# Patient Record
Sex: FEMALE | Race: WHITE | NOT HISPANIC OR LATINO
[De-identification: names, ages, dates, MRNs, and addresses within clinical notes are randomized per-mention and may not be internally consistent; named-entity substitution may affect disease eponyms.]

---

## 2017-01-24 ENCOUNTER — APPOINTMENT (OUTPATIENT)
Dept: GASTROENTEROLOGY | Facility: CLINIC | Age: 57
End: 2017-01-24

## 2017-01-24 VITALS
HEIGHT: 63 IN | HEART RATE: 83 BPM | SYSTOLIC BLOOD PRESSURE: 124 MMHG | WEIGHT: 113 LBS | TEMPERATURE: 99.1 F | RESPIRATION RATE: 14 BRPM | BODY MASS INDEX: 20.02 KG/M2 | DIASTOLIC BLOOD PRESSURE: 81 MMHG | OXYGEN SATURATION: 99 %

## 2017-01-24 DIAGNOSIS — R13.14 DYSPHAGIA, PHARYNGOESOPHAGEAL PHASE: ICD-10-CM

## 2017-01-24 DIAGNOSIS — R13.10 DYSPHAGIA, UNSPECIFIED: ICD-10-CM

## 2017-01-24 DIAGNOSIS — R12 HEARTBURN: ICD-10-CM

## 2017-01-24 DIAGNOSIS — K22.4 DYSKINESIA OF ESOPHAGUS: ICD-10-CM

## 2017-01-27 PROBLEM — R12 HEARTBURN: Status: ACTIVE | Noted: 2017-01-27

## 2017-01-27 PROBLEM — K22.4 ESOPHAGEAL DYSMOTILITY: Status: ACTIVE | Noted: 2017-01-27

## 2017-01-27 PROBLEM — R13.10 DYSPHAGIA, UNSPECIFIED TYPE: Status: ACTIVE | Noted: 2017-01-27

## 2017-01-27 PROBLEM — R13.14 ESOPHAGEAL DYSPHAGIA: Status: ACTIVE | Noted: 2017-01-27

## 2017-02-02 ENCOUNTER — CLINICAL ADVICE (OUTPATIENT)
Age: 57
End: 2017-02-02

## 2017-03-23 ENCOUNTER — RESULT REVIEW (OUTPATIENT)
Age: 57
End: 2017-03-23

## 2017-03-24 ENCOUNTER — APPOINTMENT (OUTPATIENT)
Dept: GASTROENTEROLOGY | Facility: HOSPITAL | Age: 57
End: 2017-03-24

## 2017-03-24 ENCOUNTER — OUTPATIENT (OUTPATIENT)
Dept: OUTPATIENT SERVICES | Facility: HOSPITAL | Age: 57
LOS: 1 days | End: 2017-03-24
Payer: COMMERCIAL

## 2017-03-24 DIAGNOSIS — M35.00 SJOGREN SYNDROME, UNSPECIFIED: ICD-10-CM

## 2017-03-24 DIAGNOSIS — K21.9 GASTRO-ESOPHAGEAL REFLUX DISEASE WITHOUT ESOPHAGITIS: ICD-10-CM

## 2017-03-24 PROCEDURE — 88342 IMHCHEM/IMCYTCHM 1ST ANTB: CPT

## 2017-03-24 PROCEDURE — 43239 EGD BIOPSY SINGLE/MULTIPLE: CPT

## 2017-03-24 PROCEDURE — 43239 EGD BIOPSY SINGLE/MULTIPLE: CPT | Mod: GC

## 2017-03-24 PROCEDURE — 88305 TISSUE EXAM BY PATHOLOGIST: CPT | Mod: 26

## 2017-03-24 PROCEDURE — 88305 TISSUE EXAM BY PATHOLOGIST: CPT

## 2017-03-24 PROCEDURE — 88342 IMHCHEM/IMCYTCHM 1ST ANTB: CPT | Mod: 26

## 2017-03-27 ENCOUNTER — TRANSCRIPTION ENCOUNTER (OUTPATIENT)
Age: 57
End: 2017-03-27

## 2017-03-27 LAB — SURGICAL PATHOLOGY STUDY: SIGNIFICANT CHANGE UP

## 2017-04-07 RX ORDER — SUCRALFATE 1 G/10ML
1 SUSPENSION ORAL
Qty: 1200 | Refills: 2 | Status: DISCONTINUED | COMMUNITY
Start: 2017-01-24 | End: 2017-04-07

## 2017-04-13 ENCOUNTER — APPOINTMENT (OUTPATIENT)
Dept: SURGICAL ONCOLOGY | Facility: CLINIC | Age: 57
End: 2017-04-13

## 2017-04-27 ENCOUNTER — APPOINTMENT (OUTPATIENT)
Dept: SURGICAL ONCOLOGY | Facility: CLINIC | Age: 57
End: 2017-04-27

## 2017-04-27 VITALS
HEIGHT: 63 IN | BODY MASS INDEX: 19.14 KG/M2 | WEIGHT: 108 LBS | SYSTOLIC BLOOD PRESSURE: 100 MMHG | DIASTOLIC BLOOD PRESSURE: 66 MMHG | HEART RATE: 87 BPM

## 2017-06-22 ENCOUNTER — APPOINTMENT (OUTPATIENT)
Dept: GASTROENTEROLOGY | Facility: CLINIC | Age: 57
End: 2017-06-22

## 2017-09-05 ENCOUNTER — APPOINTMENT (OUTPATIENT)
Dept: INFECTIOUS DISEASE | Facility: CLINIC | Age: 57
End: 2017-09-05
Payer: SELF-PAY

## 2017-09-05 DIAGNOSIS — Z71.89 OTHER SPECIFIED COUNSELING: ICD-10-CM

## 2017-09-05 PROCEDURE — 90472 IMMUNIZATION ADMIN EACH ADD: CPT | Mod: NC

## 2017-09-05 PROCEDURE — 90632 HEPA VACCINE ADULT IM: CPT

## 2017-09-05 PROCEDURE — 99401 PREV MED CNSL INDIV APPRX 15: CPT | Mod: 25

## 2017-09-05 PROCEDURE — 90471 IMMUNIZATION ADMIN: CPT | Mod: NC

## 2017-09-05 PROCEDURE — 90691 TYPHOID VACCINE IM: CPT

## 2017-10-26 ENCOUNTER — TRANSCRIPTION ENCOUNTER (OUTPATIENT)
Age: 57
End: 2017-10-26

## 2017-11-08 ENCOUNTER — TRANSCRIPTION ENCOUNTER (OUTPATIENT)
Age: 57
End: 2017-11-08

## 2018-03-20 ENCOUNTER — APPOINTMENT (OUTPATIENT)
Dept: INFECTIOUS DISEASE | Facility: CLINIC | Age: 58
End: 2018-03-20
Payer: SELF-PAY

## 2018-03-20 PROCEDURE — 90632 HEPA VACCINE ADULT IM: CPT

## 2018-03-20 PROCEDURE — 90471 IMMUNIZATION ADMIN: CPT | Mod: NC

## 2018-04-26 ENCOUNTER — APPOINTMENT (OUTPATIENT)
Dept: SURGICAL ONCOLOGY | Facility: CLINIC | Age: 58
End: 2018-04-26
Payer: COMMERCIAL

## 2018-04-26 VITALS
HEIGHT: 63 IN | HEART RATE: 79 BPM | OXYGEN SATURATION: 98 % | BODY MASS INDEX: 19.49 KG/M2 | DIASTOLIC BLOOD PRESSURE: 59 MMHG | WEIGHT: 110 LBS | SYSTOLIC BLOOD PRESSURE: 92 MMHG | RESPIRATION RATE: 14 BRPM

## 2018-04-26 PROCEDURE — 99214 OFFICE O/P EST MOD 30 MIN: CPT

## 2018-07-12 ENCOUNTER — TRANSCRIPTION ENCOUNTER (OUTPATIENT)
Age: 58
End: 2018-07-12

## 2018-07-20 ENCOUNTER — TRANSCRIPTION ENCOUNTER (OUTPATIENT)
Age: 58
End: 2018-07-20

## 2019-04-21 ENCOUNTER — TRANSCRIPTION ENCOUNTER (OUTPATIENT)
Age: 59
End: 2019-04-21

## 2019-05-02 ENCOUNTER — APPOINTMENT (OUTPATIENT)
Dept: SURGICAL ONCOLOGY | Facility: CLINIC | Age: 59
End: 2019-05-02
Payer: COMMERCIAL

## 2019-05-02 VITALS
HEART RATE: 72 BPM | SYSTOLIC BLOOD PRESSURE: 111 MMHG | WEIGHT: 106 LBS | DIASTOLIC BLOOD PRESSURE: 74 MMHG | BODY MASS INDEX: 18.78 KG/M2 | OXYGEN SATURATION: 99 % | HEIGHT: 63 IN

## 2019-05-02 PROCEDURE — 99214 OFFICE O/P EST MOD 30 MIN: CPT

## 2019-05-02 NOTE — PHYSICAL EXAM
[Normal] : supple, no neck mass and thyroid not enlarged [Normal Supraclavicular Lymph Nodes] : normal supraclavicular lymph nodes [Normal Axillary Lymph Nodes] : normal axillary lymph nodes [Normal] : grossly intact [FreeTextEntry1] : AB present during exam  [de-identified] : No dominant mass or nipple discharge bilaterally.

## 2019-05-02 NOTE — HISTORY OF PRESENT ILLNESS
[de-identified] : Samantha is a 58-year-old female here for ongoing breast follow up.  She has had BIRADS 3 findings in the past.  Most recent mammogram and sonogram in April 2019 demonstrated BIRADS 2 findings.  She has no personal or family history of breast or ovarian cancer and she denies any palpable mass or nipple discharge.  Samantha denies any interval changes to her health.

## 2019-05-02 NOTE — ASSESSMENT
[FreeTextEntry1] : I have asked Samantha to follow up in one year upon completion of bilateral mammogram and sonogram.

## 2020-03-13 ENCOUNTER — TRANSCRIPTION ENCOUNTER (OUTPATIENT)
Age: 60
End: 2020-03-13

## 2020-05-07 ENCOUNTER — APPOINTMENT (OUTPATIENT)
Dept: SURGICAL ONCOLOGY | Facility: CLINIC | Age: 60
End: 2020-05-07

## 2020-10-15 ENCOUNTER — APPOINTMENT (OUTPATIENT)
Dept: SURGICAL ONCOLOGY | Facility: CLINIC | Age: 60
End: 2020-10-15
Payer: COMMERCIAL

## 2020-10-15 VITALS
BODY MASS INDEX: 19.49 KG/M2 | HEART RATE: 67 BPM | HEIGHT: 63 IN | OXYGEN SATURATION: 98 % | SYSTOLIC BLOOD PRESSURE: 111 MMHG | RESPIRATION RATE: 15 BRPM | WEIGHT: 110 LBS | DIASTOLIC BLOOD PRESSURE: 75 MMHG

## 2020-10-15 PROCEDURE — 99214 OFFICE O/P EST MOD 30 MIN: CPT

## 2020-10-15 NOTE — ASSESSMENT
[FreeTextEntry1] : I have asked Samantha to follow up in April 2021 upon completion of annual mammogram and sonogram.

## 2020-10-15 NOTE — CONSULT LETTER
[Dear  ___] : Dear  [unfilled], [Consult Closing:] : Thank you very much for allowing me to participate in the care of this patient.  If you have any questions, please do not hesitate to contact me. [Consult Letter:] : I had the pleasure of evaluating your patient, [unfilled]. [Please see my note below.] : Please see my note below. [FreeTextEntry2] : Kobi Hernandez MD  [Sincerely,] : Sincerely, [FreeTextEntry1] : Samantha is a 59-year-old female here for ongoing breast follow up.  She has had BIRADS 3 findings in the past.  Most recent mammogram and sonogram in April 2020 demonstrated bilateral fluctuating cysts but no evidence of malignancy (BIRADS 2 findings).  She has no personal or family history of breast or ovarian cancer and she denies any palpable mass or nipple discharge.  Samantha denies any interval changes to her health. \par \par She recently had a steroid injection for tendinitis of the left elbow.\par \par On exam, both breasts are without any dominant masses. There is no axillary adenopathy on either side.\par \par I have asked Samantha to follow up in April 2021 upon completion of annual mammogram and sonogram. [DrFariha  ___] : Dr. COSME [FreeTextEntry3] : Rigoberto Lew MD\par Surgical Oncology\par [facility]\par Office: [phone]\par Cell: [phone]

## 2020-10-15 NOTE — HISTORY OF PRESENT ILLNESS
[de-identified] : Samantha is a 59-year-old female here for ongoing breast follow up.  She has had BIRADS 3 findings in the past.  Most recent mammogram and sonogram in April 2020 demonstrated bilateral fluctuating cysts but no evidence of malignancy (BIRADS 2 findings).  She has no personal or family history of breast or ovarian cancer and she denies any palpable mass or nipple discharge.  Samantha denies any interval changes to her health. \par \par She recently had a steroid injection for tendinitis of the left elbow.

## 2020-10-15 NOTE — PHYSICAL EXAM
[FreeTextEntry1] : Medical staff ( MB  ) present during exam\par  [Normal] : supple, no neck mass and thyroid not enlarged [Normal Neck Lymph Nodes] : normal neck lymph nodes  [Normal Supraclavicular Lymph Nodes] : normal supraclavicular lymph nodes [Normal Groin Lymph Nodes] : normal groin lymph nodes [Normal Axillary Lymph Nodes] : normal axillary lymph nodes [Normal] : oriented to person, place and time, with appropriate affect [de-identified] : No dominant mass or nipple discharge bilaterally.

## 2021-01-06 ENCOUNTER — APPOINTMENT (OUTPATIENT)
Dept: ORTHOPEDIC SURGERY | Facility: CLINIC | Age: 61
End: 2021-01-06
Payer: COMMERCIAL

## 2021-01-06 VITALS
SYSTOLIC BLOOD PRESSURE: 102 MMHG | WEIGHT: 108 LBS | TEMPERATURE: 96.3 F | DIASTOLIC BLOOD PRESSURE: 71 MMHG | HEART RATE: 66 BPM | BODY MASS INDEX: 19.14 KG/M2 | HEIGHT: 63 IN

## 2021-01-06 DIAGNOSIS — M77.12 LATERAL EPICONDYLITIS, LEFT ELBOW: ICD-10-CM

## 2021-01-06 PROCEDURE — 99203 OFFICE O/P NEW LOW 30 MIN: CPT

## 2021-01-06 PROCEDURE — 99072 ADDL SUPL MATRL&STAF TM PHE: CPT

## 2021-01-06 PROCEDURE — 73080 X-RAY EXAM OF ELBOW: CPT | Mod: LT

## 2021-02-24 ENCOUNTER — APPOINTMENT (OUTPATIENT)
Dept: ORTHOPEDIC SURGERY | Facility: CLINIC | Age: 61
End: 2021-02-24
Payer: COMMERCIAL

## 2021-02-24 PROCEDURE — 99072 ADDL SUPL MATRL&STAF TM PHE: CPT

## 2021-02-24 PROCEDURE — 99213 OFFICE O/P EST LOW 20 MIN: CPT

## 2021-02-28 ENCOUNTER — OUTPATIENT (OUTPATIENT)
Dept: OUTPATIENT SERVICES | Facility: HOSPITAL | Age: 61
LOS: 1 days | End: 2021-02-28
Payer: COMMERCIAL

## 2021-02-28 ENCOUNTER — APPOINTMENT (OUTPATIENT)
Dept: MRI IMAGING | Facility: CLINIC | Age: 61
End: 2021-02-28
Payer: COMMERCIAL

## 2021-02-28 ENCOUNTER — RESULT REVIEW (OUTPATIENT)
Age: 61
End: 2021-02-28

## 2021-02-28 DIAGNOSIS — M77.12 LATERAL EPICONDYLITIS, LEFT ELBOW: ICD-10-CM

## 2021-02-28 PROCEDURE — 73221 MRI JOINT UPR EXTREM W/O DYE: CPT

## 2021-02-28 PROCEDURE — 73221 MRI JOINT UPR EXTREM W/O DYE: CPT | Mod: 26,LT

## 2021-03-11 ENCOUNTER — TRANSCRIPTION ENCOUNTER (OUTPATIENT)
Age: 61
End: 2021-03-11

## 2021-03-12 ENCOUNTER — APPOINTMENT (OUTPATIENT)
Dept: ORTHOPEDIC SURGERY | Facility: CLINIC | Age: 61
End: 2021-03-12
Payer: COMMERCIAL

## 2021-03-12 DIAGNOSIS — M77.12 LATERAL EPICONDYLITIS, LEFT ELBOW: ICD-10-CM

## 2021-03-12 PROCEDURE — 99213 OFFICE O/P EST LOW 20 MIN: CPT

## 2021-03-12 PROCEDURE — 99072 ADDL SUPL MATRL&STAF TM PHE: CPT

## 2021-04-29 ENCOUNTER — APPOINTMENT (OUTPATIENT)
Dept: SURGICAL ONCOLOGY | Facility: CLINIC | Age: 61
End: 2021-04-29
Payer: COMMERCIAL

## 2021-04-29 VITALS
SYSTOLIC BLOOD PRESSURE: 130 MMHG | BODY MASS INDEX: 22.19 KG/M2 | HEIGHT: 60 IN | HEART RATE: 71 BPM | RESPIRATION RATE: 15 BRPM | OXYGEN SATURATION: 98 % | DIASTOLIC BLOOD PRESSURE: 83 MMHG | WEIGHT: 113 LBS

## 2021-04-29 DIAGNOSIS — N60.09 SOLITARY CYST OF UNSPECIFIED BREAST: ICD-10-CM

## 2021-04-29 PROCEDURE — 99214 OFFICE O/P EST MOD 30 MIN: CPT

## 2021-04-29 PROCEDURE — 99072 ADDL SUPL MATRL&STAF TM PHE: CPT

## 2021-04-29 NOTE — PHYSICAL EXAM
[Normal] : supple, no neck mass and thyroid not enlarged [Normal Supraclavicular Lymph Nodes] : normal supraclavicular lymph nodes [Normal Axillary Lymph Nodes] : normal axillary lymph nodes [Normal] : oriented to person, place and time, with appropriate affect [FreeTextEntry1] : AB present during exam \par  [de-identified] : No dominant mass or nipple discharge bilaterally.

## 2021-04-29 NOTE — HISTORY OF PRESENT ILLNESS
[de-identified] : Samantha is a 60-year-old female here for ongoing breast follow up.  She has had BIRADS 3 findings in the past.  Most recent mammogram and sonogram in April 2021 demonstrated bilateral fluctuating cysts but no evidence of malignancy (BIRADS 2 findings).  She has no personal or family history of breast or ovarian cancer and she denies any palpable mass or nipple discharge.  Samantha denies any interval changes to her health. \par \par She denies any palpable breast masses, nipple inversion, nipple discharge or skin change.

## 2021-04-29 NOTE — ASSESSMENT
[FreeTextEntry1] : Samantha will follow up with me in 1 year upon completion of annual mammogram and sonogram.

## 2021-04-29 NOTE — CONSULT LETTER
[Dear  ___] : Dear  [unfilled], [Consult Letter:] : I had the pleasure of evaluating your patient, [unfilled]. [Please see my note below.] : Please see my note below. [Consult Closing:] : Thank you very much for allowing me to participate in the care of this patient.  If you have any questions, please do not hesitate to contact me. [Sincerely,] : Sincerely, [DrFariha  ___] : Dr. COSME [FreeTextEntry2] : Kobi Hernandez MD  [FreeTextEntry3] : Rigoberto Lew MD\par Surgical Oncology\par [facility]\par Office: [phone]\par Cell: [phone]

## 2021-08-06 ENCOUNTER — TRANSCRIPTION ENCOUNTER (OUTPATIENT)
Age: 61
End: 2021-08-06

## 2022-06-09 ENCOUNTER — APPOINTMENT (OUTPATIENT)
Dept: SURGICAL ONCOLOGY | Facility: CLINIC | Age: 62
End: 2022-06-09
Payer: COMMERCIAL

## 2022-06-09 VITALS
BODY MASS INDEX: 22.19 KG/M2 | HEART RATE: 73 BPM | RESPIRATION RATE: 16 BRPM | SYSTOLIC BLOOD PRESSURE: 109 MMHG | HEIGHT: 60 IN | WEIGHT: 113 LBS | TEMPERATURE: 96.5 F | DIASTOLIC BLOOD PRESSURE: 72 MMHG | OXYGEN SATURATION: 98 %

## 2022-06-09 PROCEDURE — 99214 OFFICE O/P EST MOD 30 MIN: CPT

## 2022-06-09 NOTE — CONSULT LETTER
[Dear  ___] : Dear  [unfilled], [Consult Letter:] : I had the pleasure of evaluating your patient, [unfilled]. [Please see my note below.] : Please see my note below. [Consult Closing:] : Thank you very much for allowing me to participate in the care of this patient.  If you have any questions, please do not hesitate to contact me. [Sincerely,] : Sincerely, [DrFariha  ___] : Dr. COSME [FreeTextEntry2] : Kobi Hernandez MD  [FreeTextEntry3] : Rigoberto Lew MD\par Surgical Oncology\par [facility]\par Office: [phone]\par Cell: [phone]

## 2022-06-09 NOTE — HISTORY OF PRESENT ILLNESS
[de-identified] : Samantha is a 61-year-old female here for ongoing breast follow up.  \par \par She has had BIRADS 3 findings in the past. Mammogram and sonogram in April 2021 demonstrated bilateral fluctuating cysts but no evidence of malignancy (BIRADS 2 findings).  She has no personal or family history of breast or ovarian cancer and she denies any palpable mass or nipple discharge.  Samantha denies any interval changes to her health. \par \par Her most recent breast imaging from 6/4/2022 includes bilateral mammo/sono with benign findings, BI-RADS 2\par 6/9/2022: Today she denies palpable breast masses, nipple discharge, skin changes, inversion or breast pain. She denies any new health issues or concerns. \par

## 2022-06-09 NOTE — PHYSICAL EXAM
[Normal] : supple, no neck mass and thyroid not enlarged [Normal Supraclavicular Lymph Nodes] : normal supraclavicular lymph nodes [Normal Axillary Lymph Nodes] : normal axillary lymph nodes [Normal] : oriented to person, place and time, with appropriate affect [de-identified] : No dominant mass or nipple discharge bilaterally.  [FreeTextEntry1] : SS present during exam \par

## 2023-04-11 ENCOUNTER — APPOINTMENT (OUTPATIENT)
Dept: GASTROENTEROLOGY | Facility: CLINIC | Age: 63
End: 2023-04-11
Payer: COMMERCIAL

## 2023-04-11 VITALS
OXYGEN SATURATION: 98 % | TEMPERATURE: 98.6 F | DIASTOLIC BLOOD PRESSURE: 80 MMHG | BODY MASS INDEX: 16.29 KG/M2 | SYSTOLIC BLOOD PRESSURE: 134 MMHG | HEIGHT: 69 IN | HEART RATE: 69 BPM | WEIGHT: 110 LBS

## 2023-04-11 PROCEDURE — 99204 OFFICE O/P NEW MOD 45 MIN: CPT

## 2023-04-11 NOTE — HISTORY OF PRESENT ILLNESS
[FreeTextEntry1] : This is a pleasant 62 year old female with a history of dysphagia, esophageal dysmotility, GERD, indigestion, Sjogren's syndrome who presents today for follow up visit. Patient states that in January, 2023 she was diagnosed with covid. She was treated with Paxlovid and then antibiotic for congestion. She states that while she had covid her she experienced increase acid reflux and dysphonia. She reports that the dysphonia has resolved, GERD has improved, but she continues to experience frequent burping. Patient states that she took Carafate for about 5 weeks, but stopped due to feeling very fatigue and sleepy and increase burping. She then started taking Licorice root extract 3 times daily, and since last Saturday BID. Patient noted that today her B/P is higher than usual and is concern that it may be due to Licorice.

## 2023-04-11 NOTE — ASSESSMENT
[FreeTextEntry1] : This is a pleasant 62 year old female with a history of dysphagia, esophageal dysmotility, GERD, indigestion, Sjogren's syndrome who presents today for follow up visit. Patient states that in January, 2023 she was diagnosed with covid. She was treated with Paxlovid and then antibiotic for congestion. She states that while she had covid her she experienced increase acid reflux and dysphonia. She reports that the dysphonia has resolved, GERD has improved, but she continues to experience frequent burping. Patient states that she took Carafate for about 5 weeks, but stopped due to feeling very fatigue and sleepy and increase burping. She then started taking Licorice root extract 3 times daily, and since last Saturday BID. Patient noted that today her B/P is higher than usual and is concern that it may be due to Licorice. For her acid reflux and burping I recommend that she take Pepcid daily or as needed. Patient states that she already purchase medication over the counter. She is to continue with small frequent meals, and avoid lying down after eating. Patient advised to call the office with her symptoms worsens.

## 2023-04-11 NOTE — PHYSICAL EXAM
[Alert] : alert [Normal Voice/Communication] : normal voice/communication [Healthy Appearing] : healthy appearing [No Acute Distress] : no acute distress [Sclera] : the sclera and conjunctiva were normal [Hearing Threshold Finger Rub Not Chambers] : hearing was normal [Normal Lips/Gums] : the lips and gums were normal [Oropharynx] : the oropharynx was normal [No Neck Mass] : no neck mass was observed [Normal Appearance] : the appearance of the neck was normal [No Respiratory Distress] : no respiratory distress [No Acc Muscle Use] : no accessory muscle use [Respiration, Rhythm And Depth] : normal respiratory rhythm and effort [Heart Rate And Rhythm] : heart rate was normal and rhythm regular [Auscultation Breath Sounds / Voice Sounds] : lungs were clear to auscultation bilaterally [Normal S1, S2] : normal S1 and S2 [Murmurs] : no murmurs [Bowel Sounds] : normal bowel sounds [Abdomen Tenderness] : non-tender [No Masses] : no abdominal mass palpated [] : no hepatosplenomegaly [Abdomen Soft] : soft [Oriented To Time, Place, And Person] : oriented to person, place, and time

## 2023-05-30 ENCOUNTER — APPOINTMENT (OUTPATIENT)
Dept: GASTROENTEROLOGY | Facility: CLINIC | Age: 63
End: 2023-05-30
Payer: COMMERCIAL

## 2023-05-30 VITALS
DIASTOLIC BLOOD PRESSURE: 60 MMHG | SYSTOLIC BLOOD PRESSURE: 100 MMHG | HEART RATE: 62 BPM | TEMPERATURE: 98.3 F | HEIGHT: 69 IN | OXYGEN SATURATION: 98 % | BODY MASS INDEX: 16.74 KG/M2 | WEIGHT: 113 LBS

## 2023-05-30 PROCEDURE — 99213 OFFICE O/P EST LOW 20 MIN: CPT

## 2023-05-30 NOTE — HISTORY OF PRESENT ILLNESS
[FreeTextEntry1] : 62 year old female with an esophageal motility disorder with incomplete peristaltic contraction and low LES pressure. Pt ws having severe reflux and was put on Pepcid 20 one a day. She said it worked for the first few days and then stopped working. She started taking Joe seeds and felt better. However she is still having some reflux.

## 2023-05-30 NOTE — ASSESSMENT
[FreeTextEntry1] : I discussed that she has reflux on the basis of poor peristaltic contraction, low LES pressure and Sjogren Syndrome. \par \par Plan: Pepcid 20 mg BID.

## 2023-07-13 ENCOUNTER — APPOINTMENT (OUTPATIENT)
Dept: SURGICAL ONCOLOGY | Facility: CLINIC | Age: 63
End: 2023-07-13
Payer: COMMERCIAL

## 2023-07-13 VITALS
DIASTOLIC BLOOD PRESSURE: 78 MMHG | BODY MASS INDEX: 20.2 KG/M2 | WEIGHT: 114 LBS | OXYGEN SATURATION: 99 % | HEIGHT: 63 IN | HEART RATE: 73 BPM | SYSTOLIC BLOOD PRESSURE: 116 MMHG | RESPIRATION RATE: 16 BRPM

## 2023-07-13 DIAGNOSIS — Z12.39 ENCOUNTER FOR OTHER SCREENING FOR MALIGNANT NEOPLASM OF BREAST: ICD-10-CM

## 2023-07-13 DIAGNOSIS — R92.2 INCONCLUSIVE MAMMOGRAM: ICD-10-CM

## 2023-07-13 PROCEDURE — 99214 OFFICE O/P EST MOD 30 MIN: CPT

## 2023-07-14 NOTE — HISTORY OF PRESENT ILLNESS
[de-identified] : Ms. LEWIS RIVAS is a 62 year old woman here for ongoing follow-up\par \par She has had BIRADS 3 findings in the past. Mammogram and sonogram in April 2021 demonstrated bilateral fluctuating cysts but no evidence of malignancy (BIRADS 2 findings).  She has no personal or family history of breast or ovarian cancer\par \par Breast imaging from 6/4/2022 includes bilateral mammo/sono with benign findings, BI-RADS 2\par 6/9/2022: She denies palpable breast masses, nipple discharge, skin changes, inversion or breast pain. She denies any new health issues or concerns. Lewis will undergo her annual breast imaging and follow-up in 1 year.\par \par B/L mammo/sono 6/10/2023 with benign findings, BI-RADS 2\par \par 7/13/2023 - Leiws denies any new changes to her health, she denies palpable breast masses, nipple discharge, skin changes, inversion or breast pain. She contracted COVID in January and had a long recovery but is otherwise doing well now.

## 2023-07-14 NOTE — CONSULT LETTER
[Dear  ___] : Dear  [unfilled], [Consult Letter:] : I had the pleasure of evaluating your patient, [unfilled]. [Please see my note below.] : Please see my note below. [Consult Closing:] : Thank you very much for allowing me to participate in the care of this patient.  If you have any questions, please do not hesitate to contact me. [Sincerely,] : Sincerely, [DrFariha  ___] : Dr. COSME [FreeTextEntry2] : Kobi Hernandez MD  [FreeTextEntry3] : Rigoberto Lew MD\par Surgical Oncology\par [facility]\par Office: [phone]\par Cell: [phone]

## 2023-07-14 NOTE — PHYSICAL EXAM
[Normal] : supple, no neck mass and thyroid not enlarged [Normal Supraclavicular Lymph Nodes] : normal supraclavicular lymph nodes [Normal Axillary Lymph Nodes] : normal axillary lymph nodes [Normal] : oriented to person, place and time, with appropriate affect [FreeTextEntry1] : SS present during exam \par  [de-identified] : No dominant mass or nipple discharge bilaterally.

## 2023-07-14 NOTE — ASSESSMENT
[FreeTextEntry1] : Samantha continues to do well, she will undergo her annual mammogram & sonogram in June 2024 and follow-up with us shortly thereafter.\par

## 2023-07-14 NOTE — REASON FOR VISIT
[Follow-Up Visit] : a follow-up visit for [Fibrocystic Breast] : fibrocystic breast [FreeTextEntry2] : breast surveillance

## 2023-12-15 ENCOUNTER — APPOINTMENT (OUTPATIENT)
Dept: OTOLARYNGOLOGY | Facility: CLINIC | Age: 63
End: 2023-12-15
Payer: COMMERCIAL

## 2023-12-15 VITALS
BODY MASS INDEX: 20.38 KG/M2 | SYSTOLIC BLOOD PRESSURE: 138 MMHG | DIASTOLIC BLOOD PRESSURE: 83 MMHG | HEART RATE: 65 BPM | HEIGHT: 63 IN | TEMPERATURE: 98.3 F | WEIGHT: 115 LBS

## 2023-12-15 PROCEDURE — 31231 NASAL ENDOSCOPY DX: CPT

## 2023-12-15 PROCEDURE — 99204 OFFICE O/P NEW MOD 45 MIN: CPT | Mod: 25

## 2023-12-15 RX ORDER — AZELASTINE HYDROCHLORIDE 137 UG/1
0.1 SPRAY, METERED NASAL TWICE DAILY
Qty: 3 | Refills: 3 | Status: ACTIVE | COMMUNITY
Start: 2023-12-15 | End: 1900-01-01

## 2023-12-15 RX ORDER — AZITHROMYCIN 250 MG/1
250 TABLET, FILM COATED ORAL
Qty: 4 | Refills: 0 | Status: COMPLETED | COMMUNITY
Start: 2017-09-05 | End: 2023-12-15

## 2023-12-15 RX ORDER — FLUTICASONE PROPIONATE 50 UG/1
50 SPRAY, METERED NASAL TWICE DAILY
Qty: 3 | Refills: 3 | Status: ACTIVE | COMMUNITY
Start: 2023-12-15 | End: 1900-01-01

## 2023-12-15 RX ORDER — ATOVAQUONE AND PROGUANIL HYDROCHLORIDE 250; 100 MG/1; MG/1
250-100 TABLET, FILM COATED ORAL DAILY
Qty: 12 | Refills: 0 | Status: COMPLETED | COMMUNITY
Start: 2017-09-05 | End: 2023-12-15

## 2023-12-15 NOTE — PHYSICAL EXAM
[] : septum deviated to the left [Midline] : trachea located in midline position [Normal] : no rashes [de-identified] : 8mm sebaceous syst with punctum to left lower neck.

## 2023-12-15 NOTE — HISTORY OF PRESENT ILLNESS
[de-identified] : 61 y/o F 3 months ago noted small lump at her left lower neck.  No pain, no change in size.  No f/c/s, no night sweats, No weight loss. Also, with left nasal congestion that started after a COVID infection in January.  No sinus pain or pressure.  Uses Flonase, however often only one day a week.   Pos hx of allergies, Dust mites, grass, mold, pollen.  Not using any antihistamine.

## 2023-12-15 NOTE — PROCEDURE
[FreeTextEntry6] : Flexible scope #22 was used. Right nasal passage with normal inferior, middle and superior turbinates. Nasal passage patent with clear middle meatus and sphenoethmoid recess. Left nasal passage with normal inferior, middle and superior turbinates. Nasal passage was patent with clear middle meatus and sphenoethmoid recess. No mucopurulence or polyps appreciated. Nasopharynx clear.

## 2023-12-15 NOTE — END OF VISIT
[FreeTextEntry3] : I personally saw and examined Ms. LEWIS RIVAS in detail this visit today. I personally reviewed the HPI, PMH, FH, SH, ROS and medications/allergies. I have spoken to DANIEL Aviles regarding the history and have personally determined the assessment and plan of care, and documented this myself. I was present and participated in all key portions of the encounter and all procedures noted above. I have made changes in the body of the note where appropriate.  Attesting Faculty: See Attending Signature Below

## 2023-12-15 NOTE — CONSULT LETTER
[Dear  ___] : Dear  [unfilled], [Consult Letter:] : I had the pleasure of evaluating your patient, [unfilled]. [Please see my note below.] : Please see my note below. [Consult Closing:] : Thank you very much for allowing me to participate in the care of this patient.  If you have any questions, please do not hesitate to contact me. [Sincerely,] : Sincerely, [FreeTextEntry3] : Maryellen Riley MD Otolaryngology and Cranial Base Surgery Attending Physician - Department of Otolaryngology and Head & Neck Surgery [facility]  - [employer] [facility] Office: [phone] Fax: [phone]

## 2023-12-15 NOTE — ASSESSMENT
[FreeTextEntry1] : Sebaceous cyst to left lower neck: - Discussed that it is benign, however, can have it removed if you would like in the office. - She has concern for CA in the family and would like it removed  Left nasal congestion with normal exam today.  Does have year-round seasonal allergies: - saline rinses BID - Discussed need to use Flonase daily - Start Azelastine

## 2024-01-14 ENCOUNTER — NON-APPOINTMENT (OUTPATIENT)
Age: 64
End: 2024-01-14

## 2024-01-17 ENCOUNTER — APPOINTMENT (OUTPATIENT)
Dept: OTOLARYNGOLOGY | Facility: CLINIC | Age: 64
End: 2024-01-17
Payer: COMMERCIAL

## 2024-01-17 VITALS
TEMPERATURE: 97.7 F | HEART RATE: 67 BPM | BODY MASS INDEX: 20.38 KG/M2 | HEIGHT: 63 IN | WEIGHT: 115 LBS | SYSTOLIC BLOOD PRESSURE: 137 MMHG | DIASTOLIC BLOOD PRESSURE: 77 MMHG

## 2024-01-17 DIAGNOSIS — R22.1 LOCALIZED SWELLING, MASS AND LUMP, NECK: ICD-10-CM

## 2024-01-17 PROCEDURE — 99214 OFFICE O/P EST MOD 30 MIN: CPT

## 2024-01-17 NOTE — ASSESSMENT
[FreeTextEntry1] : Infected Sebaceous cyst on the neck:  - Rx: Keflex advised to take BID for 5 days, if she starts having diarrhea, she can take it once a day. Advised to take Culturelle and yogurt with abx.   - Advised to use warm compresses on the neck  - If by Friday it is not improved she will call and we will bring her in to I&D - hopefully resolved before her procedure on 1/26 to remove the cyst - explained cannot be done if still actively inflamed/infected

## 2024-01-17 NOTE — END OF VISIT
[FreeTextEntry3] : I personally saw and examined Ms. LEWIS RIVAS  in detail this visit today. I personally reviewed the HPI, PMH, FH, SH, ROS and medications/allergies. I have spoken to DANIEL Dominique regarding the history and have personally determined the assessment and plan of care, and documented this myself. I was present and participated in all key portions of the encounter and all procedures noted above. I have made changes in the body of the note where appropriate.  Attesting Faculty: See Attending Signature Below

## 2024-01-17 NOTE — HISTORY OF PRESENT ILLNESS
[de-identified] : Patient complains she noticed that the cyst on her neck got bigger and bring red. This morning, she did notice some drainage from it. Denies fever, chills.

## 2024-01-19 ENCOUNTER — APPOINTMENT (OUTPATIENT)
Dept: OTOLARYNGOLOGY | Facility: CLINIC | Age: 64
End: 2024-01-19
Payer: COMMERCIAL

## 2024-01-19 VITALS
WEIGHT: 115 LBS | HEIGHT: 63 IN | SYSTOLIC BLOOD PRESSURE: 131 MMHG | HEART RATE: 75 BPM | BODY MASS INDEX: 20.38 KG/M2 | DIASTOLIC BLOOD PRESSURE: 78 MMHG

## 2024-01-19 PROCEDURE — 99213 OFFICE O/P EST LOW 20 MIN: CPT

## 2024-01-19 NOTE — PHYSICAL EXAM
[de-identified] : 8 mm sabeacous cyst erythemtous with no fluctuance or drainage expressed Patient/Caregiver provided printed discharge information.

## 2024-01-19 NOTE — ASSESSMENT
[FreeTextEntry1] : Infected Sebaceous cyst on the neck:  - Continue taking Keflex, she is doing well on it, if she starts having diarrhea, she can take it once a day. Advised to take Culturelle and yogurt with abx.   - Advised to continue using warm compresses on the neck  - Inflammation is improving, most likely will proceed on 1/26 to remove the cyst as infection seems to be resolving

## 2024-01-19 NOTE — HISTORY OF PRESENT ILLNESS
[de-identified] : Patient complains she noticed that the cyst on her neck got bigger and bring red. This morning, she did notice some drainage from it. Denies fever, chills.  [FreeTextEntry1] : Patient presents for follow up, states she is using warm compresses and taking Keflex as prescribed doing well with it.

## 2024-01-22 RX ORDER — CEPHALEXIN 250 MG/1
250 TABLET ORAL
Qty: 10 | Refills: 0 | Status: ACTIVE | COMMUNITY
Start: 2024-01-17 | End: 1900-01-01

## 2024-01-26 ENCOUNTER — APPOINTMENT (OUTPATIENT)
Dept: OTOLARYNGOLOGY | Facility: CLINIC | Age: 64
End: 2024-01-26
Payer: COMMERCIAL

## 2024-01-26 VITALS
DIASTOLIC BLOOD PRESSURE: 71 MMHG | WEIGHT: 115 LBS | TEMPERATURE: 97.8 F | BODY MASS INDEX: 20.38 KG/M2 | SYSTOLIC BLOOD PRESSURE: 115 MMHG | HEART RATE: 73 BPM | HEIGHT: 63 IN

## 2024-01-26 PROCEDURE — 11422 EXC H-F-NK-SP B9+MARG 1.1-2: CPT

## 2024-01-29 NOTE — PROCEDURE
[FreeTextEntry3] : Left neck sebaceous cyst excision. Informed consent obtained. Neck prepped with betadine. Infiltrated left neck with 1% lido with epinephrine and elliptical incision made with #15 blade around the cyst which had ruptured through the skin. This was a 1.5 cm size lesion. All the affected skin was included and the was then undermined with a scissors taking care to excise the entire cyst. Squamous debris was noted within the cyst. The underlying fat was normal in appearance. The wound was closed with buried 5-0 biosyn and the skin was closed with 5-0 running locking nylon suture. Bacitracin and bandaid applied. Patient tolerated procedure well.

## 2024-02-02 ENCOUNTER — APPOINTMENT (OUTPATIENT)
Dept: OTOLARYNGOLOGY | Facility: CLINIC | Age: 64
End: 2024-02-02
Payer: COMMERCIAL

## 2024-02-02 VITALS
TEMPERATURE: 98 F | BODY MASS INDEX: 20.38 KG/M2 | SYSTOLIC BLOOD PRESSURE: 115 MMHG | WEIGHT: 115 LBS | DIASTOLIC BLOOD PRESSURE: 78 MMHG | HEART RATE: 73 BPM | HEIGHT: 63 IN

## 2024-02-02 PROCEDURE — 99024 POSTOP FOLLOW-UP VISIT: CPT

## 2024-02-02 NOTE — HISTORY OF PRESENT ILLNESS
[de-identified] : s/p left neck sebaceous cyst excision 1/26/2024. She is doing well. Has tenderness around the area. Denies fever, chills, bleeding at incision site.

## 2024-02-02 NOTE — ASSESSMENT
[FreeTextEntry1] : s/p left neck sebaceous cyst excision 1/26/2024.  - stiches removed  - healing well  - can apply Vaseline or Aquaphor around the incision if its feeling irritated  - f/u in March as scheduled or prn  - Will do audio to evaluate hearing loss at next visit as she would like this checked out

## 2024-02-02 NOTE — PHYSICAL EXAM
[Normal] : temporomandibular joint is normal [de-identified] : left neck incision healing well, sutures removed

## 2024-03-08 ENCOUNTER — APPOINTMENT (OUTPATIENT)
Dept: OTOLARYNGOLOGY | Facility: CLINIC | Age: 64
End: 2024-03-08
Payer: COMMERCIAL

## 2024-03-08 VITALS
SYSTOLIC BLOOD PRESSURE: 121 MMHG | DIASTOLIC BLOOD PRESSURE: 78 MMHG | WEIGHT: 115 LBS | BODY MASS INDEX: 21.71 KG/M2 | HEIGHT: 61 IN | HEART RATE: 62 BPM

## 2024-03-08 DIAGNOSIS — R09.81 NASAL CONGESTION: ICD-10-CM

## 2024-03-08 DIAGNOSIS — L72.3 SEBACEOUS CYST: ICD-10-CM

## 2024-03-08 DIAGNOSIS — H93.13 TINNITUS, BILATERAL: ICD-10-CM

## 2024-03-08 PROCEDURE — 92557 COMPREHENSIVE HEARING TEST: CPT

## 2024-03-08 PROCEDURE — 99213 OFFICE O/P EST LOW 20 MIN: CPT | Mod: 25

## 2024-03-08 PROCEDURE — 92567 TYMPANOMETRY: CPT

## 2024-03-08 RX ORDER — OLOPATADINE HYDROCHLORIDE AND MOMETASONE FUROATE 25; 665 UG/1; UG/1
665-25 SPRAY, METERED NASAL TWICE DAILY
Qty: 1 | Refills: 11 | Status: ACTIVE | COMMUNITY
Start: 2024-03-08 | End: 1900-01-01

## 2024-03-08 NOTE — END OF VISIT
[FreeTextEntry3] : I personally saw and examined LEWIS RIVAS in detail.  I spoke to EDMOND Sanchez regarding the assessment and plan of care. I performed the procedures and relevant physical exam.  I have reviewed the above assessment and plan of care and I agree.  I have made changes to the body of the note wherever necessary and appropriate.

## 2024-03-08 NOTE — PHYSICAL EXAM
[Normal] : tympanic membranes are normal in both ears [de-identified] : no sutures / small amount of crusting removed / some thickness to the incision but oterhwise healing well with no evidence of hypertrophy

## 2024-03-08 NOTE — ASSESSMENT
[FreeTextEntry1] : s/p left neck sebaceous cyst excision 1/26/2024. - information given for silicone scar cream to be used daily for a month - healing well  one episode of tinnitus: - ear exam is unremarkable - Audio today with symmetric HF SNHL - yearly audio

## 2024-03-08 NOTE — HISTORY OF PRESENT ILLNESS
[de-identified] : S/p left neck sebaceous cyst excision 1/26/2024. She is doing well. feels she may have sutures which need to be removed. no pain or drainage. Also notes she had ringing once for a short time in the left ear. It has since resolved but she would like her hearing checked.

## 2024-03-28 ENCOUNTER — APPOINTMENT (OUTPATIENT)
Dept: OTOLARYNGOLOGY | Facility: CLINIC | Age: 64
End: 2024-03-28

## 2024-07-11 ENCOUNTER — APPOINTMENT (OUTPATIENT)
Dept: SURGICAL ONCOLOGY | Facility: CLINIC | Age: 64
End: 2024-07-11
Payer: COMMERCIAL

## 2024-07-11 VITALS
HEIGHT: 61 IN | OXYGEN SATURATION: 98 % | HEART RATE: 82 BPM | BODY MASS INDEX: 22.28 KG/M2 | RESPIRATION RATE: 17 BRPM | WEIGHT: 118 LBS | DIASTOLIC BLOOD PRESSURE: 71 MMHG | SYSTOLIC BLOOD PRESSURE: 119 MMHG

## 2024-07-11 DIAGNOSIS — R92.30 DENSE BREASTS, UNSPECIFIED: ICD-10-CM

## 2024-07-11 PROCEDURE — 99214 OFFICE O/P EST MOD 30 MIN: CPT

## 2024-08-01 ENCOUNTER — NON-APPOINTMENT (OUTPATIENT)
Age: 64
End: 2024-08-01

## 2025-01-21 ENCOUNTER — APPOINTMENT (OUTPATIENT)
Dept: GASTROENTEROLOGY | Facility: CLINIC | Age: 65
End: 2025-01-21
Payer: COMMERCIAL

## 2025-01-21 VITALS
WEIGHT: 123 LBS | HEART RATE: 70 BPM | DIASTOLIC BLOOD PRESSURE: 58 MMHG | OXYGEN SATURATION: 98 % | HEIGHT: 61 IN | RESPIRATION RATE: 16 BRPM | SYSTOLIC BLOOD PRESSURE: 123 MMHG | BODY MASS INDEX: 23.22 KG/M2

## 2025-01-21 PROCEDURE — 99214 OFFICE O/P EST MOD 30 MIN: CPT

## 2025-01-23 ENCOUNTER — NON-APPOINTMENT (OUTPATIENT)
Age: 65
End: 2025-01-23

## 2025-02-05 DIAGNOSIS — D12.6 BENIGN NEOPLASM OF COLON, UNSPECIFIED: ICD-10-CM

## 2025-02-05 RX ORDER — SODIUM SULFATE, POTASSIUM SULFATE AND MAGNESIUM SULFATE 1.6; 3.13; 17.5 G/177ML; G/177ML; G/177ML
17.5-3.13-1.6 SOLUTION ORAL
Qty: 1 | Refills: 0 | Status: ACTIVE | COMMUNITY
Start: 2025-02-05 | End: 1900-01-01

## 2025-03-17 ENCOUNTER — APPOINTMENT (OUTPATIENT)
Dept: OTOLARYNGOLOGY | Facility: CLINIC | Age: 65
End: 2025-03-17
Payer: COMMERCIAL

## 2025-03-17 VITALS
DIASTOLIC BLOOD PRESSURE: 73 MMHG | HEIGHT: 62 IN | BODY MASS INDEX: 22.08 KG/M2 | WEIGHT: 120 LBS | OXYGEN SATURATION: 98 % | HEART RATE: 123 BPM | SYSTOLIC BLOOD PRESSURE: 118 MMHG

## 2025-03-17 DIAGNOSIS — R09.81 NASAL CONGESTION: ICD-10-CM

## 2025-03-17 DIAGNOSIS — H90.5 UNSPECIFIED SENSORINEURAL HEARING LOSS: ICD-10-CM

## 2025-03-17 DIAGNOSIS — H93.13 TINNITUS, BILATERAL: ICD-10-CM

## 2025-03-17 DIAGNOSIS — L72.3 SEBACEOUS CYST: ICD-10-CM

## 2025-03-17 PROCEDURE — 99213 OFFICE O/P EST LOW 20 MIN: CPT | Mod: 25

## 2025-03-17 PROCEDURE — 92557 COMPREHENSIVE HEARING TEST: CPT

## 2025-03-17 PROCEDURE — 92567 TYMPANOMETRY: CPT

## 2025-03-17 PROCEDURE — 31231 NASAL ENDOSCOPY DX: CPT

## 2025-04-28 ENCOUNTER — OUTPATIENT (OUTPATIENT)
Dept: OUTPATIENT SERVICES | Facility: HOSPITAL | Age: 65
LOS: 1 days | End: 2025-04-28
Payer: COMMERCIAL

## 2025-04-28 ENCOUNTER — APPOINTMENT (OUTPATIENT)
Dept: CT IMAGING | Facility: CLINIC | Age: 65
End: 2025-04-28

## 2025-04-28 DIAGNOSIS — D12.6 BENIGN NEOPLASM OF COLON, UNSPECIFIED: ICD-10-CM

## 2025-04-28 PROCEDURE — 74263 CT COLONOGRAPHY SCREENING: CPT | Mod: 26

## 2025-04-28 PROCEDURE — 74263 CT COLONOGRAPHY SCREENING: CPT

## 2025-07-14 ENCOUNTER — APPOINTMENT (OUTPATIENT)
Dept: SURGICAL ONCOLOGY | Facility: CLINIC | Age: 65
End: 2025-07-14

## 2025-09-08 ENCOUNTER — APPOINTMENT (OUTPATIENT)
Age: 65
End: 2025-09-08
Payer: COMMERCIAL

## 2025-09-08 ENCOUNTER — NON-APPOINTMENT (OUTPATIENT)
Age: 65
End: 2025-09-08

## 2025-09-08 PROCEDURE — 92202 OPSCPY EXTND ON/MAC DRAW: CPT

## 2025-09-08 PROCEDURE — 92134 CPTRZ OPH DX IMG PST SGM RTA: CPT

## 2025-09-08 PROCEDURE — 92004 COMPRE OPH EXAM NEW PT 1/>: CPT
